# Patient Record
Sex: MALE | Race: WHITE | NOT HISPANIC OR LATINO | Employment: FULL TIME | URBAN - METROPOLITAN AREA
[De-identification: names, ages, dates, MRNs, and addresses within clinical notes are randomized per-mention and may not be internally consistent; named-entity substitution may affect disease eponyms.]

---

## 2018-08-01 ENCOUNTER — OFFICE VISIT (OUTPATIENT)
Dept: GASTROENTEROLOGY | Facility: CLINIC | Age: 43
End: 2018-08-01

## 2018-08-01 VITALS
HEIGHT: 74 IN | BODY MASS INDEX: 24.43 KG/M2 | TEMPERATURE: 97.7 F | WEIGHT: 190.4 LBS | SYSTOLIC BLOOD PRESSURE: 138 MMHG | HEART RATE: 85 BPM | OXYGEN SATURATION: 100 % | DIASTOLIC BLOOD PRESSURE: 78 MMHG

## 2018-08-01 DIAGNOSIS — R10.33 PERIUMBILICAL ABDOMINAL PAIN: Primary | ICD-10-CM

## 2018-08-01 DIAGNOSIS — R19.4 CHANGE IN BOWEL HABITS: ICD-10-CM

## 2018-08-01 PROCEDURE — 99204 OFFICE O/P NEW MOD 45 MIN: CPT | Performed by: NURSE PRACTITIONER

## 2018-08-01 NOTE — PATIENT INSTRUCTIONS
"Start Over The Counter (OTC) probiotic (Culturelle, Harvey's Colon, Health, Align or any probiotics that contain \"Lactobacillus\" or \"Bifidobacterium\") once daily.      "

## 2018-08-01 NOTE — PROGRESS NOTES
"    GASTROENTEROLOGY OUTPATIENT NEW PATIENT NOTE  Patient: AIMEE RUANO : 1975  Date of Service: 2018  Dear WILFREDO Baker   Thank you for your referral of this patient for evaluation of abd pain  CC: Abdominal Pain  Assessment/Plan                                             ASSESSMENT & PLANS     Periumbilical abdominal pain  Change in bowel habits  -     Colonoscopy; Future  -     CT Abdomen Pelvis With Contrast; Future  · Start Over The Counter (OTC) probiotic (Culturelle, Harvey's Colon, Health, Align or any probiotics that contain \"Lactobacillus\" or \"Bifidobacterium\") once daily.    [Addendum:  CT result discussed w/ Dr Brunner.  Dr Brunner says that he will review the images when he is back to work and that Dr Brunner will put in order for repeat labs since pt's most recent lab was at his PCP's office and it was in 10/2017.]    Follow Up:Pending test result.      DISCUSSION: The patient’s case was discussed with Dr. Brunner, and we collaborated pt’s plan of care.The above plan was delineated in details with patient and all questions and concerns were answered.  Patient is also given contact information.  Patient is to return as scheduled or sooner if new problems arise  Subjective                                                     SUBJECTIVE   History of Present Illness  Mr. Aimee Ruano is a 42 y.o. male, Dr Brunner's friend, who presents to the clinic today for an evaluation of Abdominal Pain    Onset Oct or 2017  Intermittent, occuring daily  Worsening the last few weeks     Location Right side of umbilicus     Triggers  Worsens by Worse w/ sitting  Coffee, alcohol  Not sure eating makes pain worse, but large meals     Severity/Quality/  Frequency Feeling like pressure. Dull. Annoying   Feeling like a soft ball     Relieving factors  Standing        Associating Sx  + and - Mostly loose stools or Huntington 5 or 6.  Pt attributes to eating lots of fruit and veggies.  Pt " is a vegetarian.  No red meat.     Pt denies dark black stools or bright red blood in the stools, in the toilet bowl, or on the toilet tissue.      ++bloating, chantal large meals     Prior Dx workup Colonoscopy many yrs ago     Pertinent Hx Traveled out of the country often.  No fever of chills     Risk factors  Pt  reports that he has never smoked. He does not have any smokeless tobacco history on file.  Pt  reports that he drinks alcohol.  Body mass index is 24.45 kg/m². wt gain about 5 lbs    No aleve or any other NSAIDS.        Prior Tx Tried probiotic 4 days ago.        ROS:Review of Systems   Constitutional: Negative for activity change, appetite change, fatigue, fever and unexpected weight change.   HENT: Negative for hearing loss, trouble swallowing and voice change.    Eyes: Negative for visual disturbance.   Respiratory: Negative for cough, choking, chest tightness and shortness of breath.    Cardiovascular: Negative for chest pain.   Gastrointestinal: Positive for abdominal distention (bloating), abdominal pain (RLQ) and diarrhea. Negative for anal bleeding, blood in stool, constipation, nausea, rectal pain and vomiting.   Endocrine: Negative for polydipsia and polyphagia.   Genitourinary: Negative.    Musculoskeletal: Negative for gait problem and joint swelling.   Skin: Negative for color change and rash.   Allergic/Immunologic: Negative for food allergies.   Neurological: Negative for dizziness, seizures and speech difficulty.   Hematological: Negative for adenopathy.   Psychiatric/Behavioral: Negative for confusion.     Subjective   PAST MED HX: Pt  has no past medical history on file.  PAST SURG HX: Pt  has no past surgical history on file.  FAM HX: family history includes Colon cancer in his mother.  SOC HX: Pt  reports that he has never smoked. He does not have any smokeless tobacco history on file. He reports that he drinks alcohol.   Works at [employer].  Objective                                                            OBJECTIVE   Allergy: Pt has No Known Allergies.  MEDS: No current outpatient prescriptions on file.  Wt Readings from Last 5 Encounters:   08/01/18 86.4 kg (190 lb 6.4 oz)   body mass index is 24.45 kg/m².,Temp: 97.7 °F (36.5 °C),BP: 138/78,Heart Rate: 85     Per outside medical records from [facility]  Blood work on 10/11/17:  WBC 4.7 H&H 15.3/44.6 absolute eosinophil 0.0 MCV 89  serum creatinine 0.86 AST 16 ALT 14 alkaline phosphatase 71 total bili 1.0    [Addendum:   EXAMINATION: CT ABDOMEN AND PELVIS WITH CONTRAST-08/03/2018:      FINDINGS: The lung bases are grossly clear.  The liver demonstrates  mildly diffuse low attenuation throughout the hepatic parenchyma that  may represent mild hepatic steatosis without focal liver lesion. The  gallbladder is unremarkable. Nephrogram. No obstructive uropathy or  urolithiasis is evident. Portal veins and IVC patent. Nonaneurysmal  patent abdominal aorta. GI tract evaluation without focal thickening or  disproportionate dilatation of bowel. Appendix is visualized and  unremarkable. No free fluid or intra-abdominal free air. Sigmoid  diverticulosis without evidence for acute diverticulitis. Noted mildly  enlarged right lower quadrant mesenteric lymph nodes more conspicuous in  overall number than size as these are subcentimeter in long axis  dimension. The pelvic viscera are grossly unremarkable with moderately  distended urinary bladder, however, no bulky pelvic adenopathy or  significant free fluid. Degenerative changes of the spine are minimal  without aggressive osseous lesion. No soft tissue body wall findings of  concern specifically, no obvious inguinal hernia or superficial inguinal  adenopathy.     IMPRESSION:  1. Mildly enlarged right lower quadrant lymph nodes more conspicuous in  number than overall size, all of which are subcentimeter in long axis  dimension. Considerations for reactive adenopathy from enteritis  or  colitis not currently visible as there is no bowel wall thickening or  potential mesenteric adenitis.  2. Appendix is visualized and unremarkable.  3. Mild hepatic steatosis.    Physical Exam   Abdominal:         General Well developed; well nourished; no acute distress.   ENT Oral mucosa pink and moist without thrush or lesions.    Neck Neck supple; trachea midline. No thyromegaly   Resp CTA; no rhonchi, rales, or wheezes.  Respiration effort normal  CV RRR; normal S1, S2; no M/R/G. No lower extremity edema  GI Abd soft, mild TTP to area noted above, ND, normal active bowel sounds.  No HSM.  No abd hernia  Skin No rash; no lesions; no bruises.  Skin turgor normal  Musc No clubbing; no cyanosis.  Gait steady  Psych Oriented to time, place, and person.  Appropriate affect  Thank you kindly for allowing me to be part of this patient’s care.    Pt care team: Barbi VILLALOBOS & Blane Malagon [facility]  08/01/182:08 PM  [facility]--Gastroenterology  [phone]    CC: Dr.Cheryl EVELIA Tomas, WILFREDO  [address] / [place] [address] FAX:[phone]

## 2018-08-03 ENCOUNTER — HOSPITAL ENCOUNTER (OUTPATIENT)
Dept: CT IMAGING | Facility: HOSPITAL | Age: 43
Discharge: HOME | End: 2018-08-03
Admitting: NURSE PRACTITIONER

## 2018-08-03 DIAGNOSIS — R10.33 PERIUMBILICAL ABDOMINAL PAIN: ICD-10-CM

## 2018-08-03 PROCEDURE — 25010000002 IOPAMIDOL 61 % SOLUTION: Performed by: NURSE PRACTITIONER

## 2018-08-03 PROCEDURE — 74177 CT ABD & PELVIS W/CONTRAST: CPT

## 2018-08-03 RX ADMIN — IOPAMIDOL 90 ML: 612 INJECTION, SOLUTION INTRAVENOUS at 13:33

## 2018-08-03 RX ADMIN — BARIUM SULFATE 450 ML: 21 SUSPENSION ORAL at 13:33

## 2018-08-07 ENCOUNTER — TELEPHONE (OUTPATIENT)
Dept: GASTROENTEROLOGY | Facility: CLINIC | Age: 43
End: 2018-08-07

## 2018-08-10 NOTE — TELEPHONE ENCOUNTER
There is delay in response because I want to discuss CT result w/ Dr Brunner before calling pt back.  We played phone tags.  Dr Brunner and I were able to communicate.      CT result discussed w/ Dr Brunner.  Dr Brunner says that he will review the images when he is back to work and that Dr Brunner will put in order for repeat labs since pt's most recent lab was at his PCP's office and it was in 10/2017.    I attempt to call pt today. No answer. Left VM

## 2018-08-10 NOTE — TELEPHONE ENCOUNTER
CT scan result and plan discussed w/ pt  Pt has some updated labs since done in late July.  He will fax those result to me

## 2018-08-14 DIAGNOSIS — Z12.11 SCREENING FOR COLON CANCER: Primary | ICD-10-CM

## 2018-08-14 RX ORDER — PEG-3350, SODIUM SULFATE, SODIUM CHLORIDE, POTASSIUM CHLORIDE, SODIUM ASCORBATE AND ASCORBIC ACID 7.5-2.691G
KIT ORAL
Qty: 1 EACH | Refills: 0 | Status: SHIPPED | OUTPATIENT
Start: 2018-08-14 | End: 2018-09-20

## 2018-08-21 ENCOUNTER — LAB REQUISITION (OUTPATIENT)
Dept: LAB | Facility: HOSPITAL | Age: 43
End: 2018-08-21

## 2018-08-21 ENCOUNTER — OUTSIDE FACILITY SERVICE (OUTPATIENT)
Dept: GASTROENTEROLOGY | Facility: CLINIC | Age: 43
End: 2018-08-21

## 2018-08-21 DIAGNOSIS — Z80.0 FAMILY HISTORY OF MALIGNANT NEOPLASM OF DIGESTIVE ORGAN: ICD-10-CM

## 2018-08-21 DIAGNOSIS — Z12.11 ENCOUNTER FOR SCREENING FOR MALIGNANT NEOPLASM OF COLON: ICD-10-CM

## 2018-08-21 DIAGNOSIS — K63.3 ULCER OF INTESTINE: ICD-10-CM

## 2018-08-21 DIAGNOSIS — K57.30 DIVERTICULOSIS OF LARGE INTESTINE WITHOUT PERFORATION OR ABSCESS WITHOUT BLEEDING: ICD-10-CM

## 2018-08-21 PROCEDURE — 88305 TISSUE EXAM BY PATHOLOGIST: CPT | Performed by: INTERNAL MEDICINE

## 2018-08-21 PROCEDURE — 45380 COLONOSCOPY AND BIOPSY: CPT | Performed by: INTERNAL MEDICINE

## 2018-08-22 LAB
CYTO UR: NORMAL
LAB AP CASE REPORT: NORMAL
LAB AP CLINICAL INFORMATION: NORMAL
LAB AP DIAGNOSIS COMMENT: NORMAL
PATH REPORT.FINAL DX SPEC: NORMAL
PATH REPORT.GROSS SPEC: NORMAL

## 2018-09-07 ENCOUNTER — TELEPHONE (OUTPATIENT)
Dept: GASTROENTEROLOGY | Facility: CLINIC | Age: 43
End: 2018-09-07

## 2018-09-07 NOTE — TELEPHONE ENCOUNTER
I called patient regarding results of colonoscopy.  He sees Barbi in the office and she referred him for the procedure.

## 2018-09-07 NOTE — TELEPHONE ENCOUNTER
Discussed biopsy results with patient that show superficial acute inflammation concerning for NSAID induced injury.  Patient however voices he does not take any NSAIDs.  He states he knows Dr. Brunner personally and will discuss with him further.  Patient also sees Barbi in the office.

## 2018-09-20 DIAGNOSIS — R19.7 DIARRHEA, UNSPECIFIED TYPE: Primary | ICD-10-CM

## 2018-09-20 DIAGNOSIS — K63.3 ULCERATION OF INTESTINE: ICD-10-CM

## 2018-09-20 DIAGNOSIS — K76.0 FATTY LIVER: ICD-10-CM

## 2018-10-24 ENCOUNTER — LAB (OUTPATIENT)
Dept: LAB | Facility: HOSPITAL | Age: 43
End: 2018-10-24

## 2018-10-24 DIAGNOSIS — K63.3 ULCERATION OF INTESTINE: ICD-10-CM

## 2018-10-24 DIAGNOSIS — K76.0 FATTY LIVER: ICD-10-CM

## 2018-10-24 DIAGNOSIS — R19.7 DIARRHEA, UNSPECIFIED TYPE: ICD-10-CM

## 2018-10-24 LAB — VIT B12 BLD-MCNC: 1377 PG/ML (ref 211–911)

## 2018-10-24 PROCEDURE — 86140 C-REACTIVE PROTEIN: CPT

## 2018-10-24 PROCEDURE — 81479 UNLISTED MOLECULAR PATHOLOGY: CPT

## 2018-10-24 PROCEDURE — 86255 FLUORESCENT ANTIBODY SCREEN: CPT

## 2018-10-24 PROCEDURE — 83516 IMMUNOASSAY NONANTIBODY: CPT

## 2018-10-24 PROCEDURE — 36415 COLL VENOUS BLD VENIPUNCTURE: CPT

## 2018-10-24 PROCEDURE — 83520 IMMUNOASSAY QUANT NOS NONAB: CPT

## 2018-10-24 PROCEDURE — 81383 HLA II TYPING 1 ALLELE HR: CPT

## 2018-10-24 PROCEDURE — 82397 CHEMILUMINESCENT ASSAY: CPT

## 2018-10-24 PROCEDURE — 88350 IMFLUOR EA ADDL 1ANTB STN PX: CPT

## 2018-10-24 PROCEDURE — 82784 ASSAY IGA/IGD/IGG/IGM EACH: CPT

## 2018-10-24 PROCEDURE — 82607 VITAMIN B-12: CPT

## 2018-10-24 PROCEDURE — 81377 HLA II TYPE 1 AG EQUIV LR: CPT

## 2018-10-24 PROCEDURE — 88346 IMFLUOR 1ST 1ANTB STAIN PX: CPT

## 2018-10-24 PROCEDURE — 82657 ENZYME CELL ACTIVITY: CPT

## 2018-10-25 LAB
ENDOMYSIUM IGA SER QL: NEGATIVE
GLIADIN PEPTIDE IGA SER-ACNC: 4 UNITS (ref 0–19)
GLIADIN PEPTIDE IGG SER-ACNC: 2 UNITS (ref 0–19)
IGA SERPL-MCNC: 378 MG/DL (ref 90–386)
TTG IGA SER-ACNC: <2 U/ML (ref 0–3)
TTG IGG SER-ACNC: 3 U/ML (ref 0–5)

## 2018-10-29 LAB
CONV COMMENT: NORMAL
DEPRECATED HLA-DQ2 QL: POSITIVE
HLA-DQ8 QL: NEGATIVE
Lab: NORMAL

## 2018-10-30 LAB — SPECIMEN STATUS: NORMAL

## 2018-11-05 LAB — REF LAB TEST RESULTS: NORMAL

## 2021-03-12 ENCOUNTER — IMMUNIZATION (OUTPATIENT)
Dept: VACCINE CLINIC | Facility: HOSPITAL | Age: 46
End: 2021-03-12

## 2021-03-12 PROCEDURE — 91300 HC SARSCOV02 VAC 30MCG/0.3ML IM: CPT | Performed by: INTERNAL MEDICINE

## 2021-03-12 PROCEDURE — 0001A: CPT | Performed by: INTERNAL MEDICINE

## 2021-04-02 ENCOUNTER — APPOINTMENT (OUTPATIENT)
Dept: VACCINE CLINIC | Facility: HOSPITAL | Age: 46
End: 2021-04-02

## 2021-04-12 ENCOUNTER — IMMUNIZATION (OUTPATIENT)
Dept: VACCINE CLINIC | Facility: HOSPITAL | Age: 46
End: 2021-04-12

## 2021-04-12 PROCEDURE — 0002A: CPT | Performed by: INTERNAL MEDICINE

## 2021-04-12 PROCEDURE — 91300 HC SARSCOV02 VAC 30MCG/0.3ML IM: CPT | Performed by: INTERNAL MEDICINE

## 2022-08-02 ENCOUNTER — TRANSCRIBE ORDERS (OUTPATIENT)
Dept: ADMINISTRATIVE | Facility: HOSPITAL | Age: 47
End: 2022-08-02

## 2022-08-02 DIAGNOSIS — Z11.59 SPECIAL SCREENING EXAMINATION FOR VIRAL DISEASE: Primary | ICD-10-CM

## 2022-09-12 ENCOUNTER — APPOINTMENT (OUTPATIENT)
Dept: PREADMISSION TESTING | Facility: HOSPITAL | Age: 47
End: 2022-09-12

## 2022-12-02 ENCOUNTER — DOCUMENTATION (OUTPATIENT)
Dept: GASTROENTEROLOGY | Facility: OTHER | Age: 47
End: 2022-12-02

## 2022-12-02 DIAGNOSIS — K63.3 ILEUM ULCER: ICD-10-CM

## 2022-12-02 DIAGNOSIS — I88.0 NONSPECIFIC MESENTERIC ADENITIS: ICD-10-CM

## 2022-12-02 DIAGNOSIS — R10.31 ABDOMINAL PAIN, RLQ: ICD-10-CM

## 2022-12-02 DIAGNOSIS — K52.9: Primary | ICD-10-CM

## 2022-12-02 NOTE — PROGRESS NOTES
Patient with recurrent diarrhea and RLQ pain. Previous colonoscopy showed aphthous ulcerations in the terminal ileum; biopsies revealed nonspecific ileitis. CT showed sub-centimeter lymph nodes in the RLQ, with consideration for reactive adenopathy from enteritis. Patient is concerned for Crohn's disease or a small bowel tumor not seen on imaging.    >> Will proceed with small bowel video capsule endoscopy.

## 2022-12-14 ENCOUNTER — APPOINTMENT (OUTPATIENT)
Dept: GASTROENTEROLOGY | Facility: HOSPITAL | Age: 47
End: 2022-12-14

## 2022-12-14 ENCOUNTER — OFFICE VISIT (OUTPATIENT)
Dept: GASTROENTEROLOGY | Facility: CLINIC | Age: 47
End: 2022-12-14

## 2022-12-14 ENCOUNTER — LAB (OUTPATIENT)
Dept: LAB | Facility: HOSPITAL | Age: 47
End: 2022-12-14

## 2022-12-14 VITALS
TEMPERATURE: 97.5 F | HEART RATE: 83 BPM | HEIGHT: 74 IN | WEIGHT: 204.6 LBS | BODY MASS INDEX: 26.26 KG/M2 | DIASTOLIC BLOOD PRESSURE: 76 MMHG | OXYGEN SATURATION: 99 % | SYSTOLIC BLOOD PRESSURE: 124 MMHG

## 2022-12-14 DIAGNOSIS — R10.31 RLQ ABDOMINAL PAIN: ICD-10-CM

## 2022-12-14 DIAGNOSIS — R10.31 RLQ ABDOMINAL PAIN: Primary | ICD-10-CM

## 2022-12-14 DIAGNOSIS — R11.0 NAUSEA: ICD-10-CM

## 2022-12-14 DIAGNOSIS — Z78.9 HX OF FOREIGN TRAVEL: ICD-10-CM

## 2022-12-14 LAB
BASOPHILS # BLD AUTO: 0.05 10*3/MM3 (ref 0–0.2)
BASOPHILS NFR BLD AUTO: 0.7 % (ref 0–1.5)
DEPRECATED RDW RBC AUTO: 40.5 FL (ref 37–54)
EOSINOPHIL # BLD AUTO: 0.04 10*3/MM3 (ref 0–0.4)
EOSINOPHIL NFR BLD AUTO: 0.6 % (ref 0.3–6.2)
ERYTHROCYTE [DISTWIDTH] IN BLOOD BY AUTOMATED COUNT: 12.4 % (ref 12.3–15.4)
HCT VFR BLD AUTO: 45.9 % (ref 37.5–51)
HGB BLD-MCNC: 15.7 G/DL (ref 13–17.7)
IMM GRANULOCYTES # BLD AUTO: 0.02 10*3/MM3 (ref 0–0.05)
IMM GRANULOCYTES NFR BLD AUTO: 0.3 % (ref 0–0.5)
LYMPHOCYTES # BLD AUTO: 1.77 10*3/MM3 (ref 0.7–3.1)
LYMPHOCYTES NFR BLD AUTO: 25.4 % (ref 19.6–45.3)
MCH RBC QN AUTO: 30.4 PG (ref 26.6–33)
MCHC RBC AUTO-ENTMCNC: 34.2 G/DL (ref 31.5–35.7)
MCV RBC AUTO: 89 FL (ref 79–97)
MONOCYTES # BLD AUTO: 0.54 10*3/MM3 (ref 0.1–0.9)
MONOCYTES NFR BLD AUTO: 7.7 % (ref 5–12)
NEUTROPHILS NFR BLD AUTO: 4.56 10*3/MM3 (ref 1.7–7)
NEUTROPHILS NFR BLD AUTO: 65.3 % (ref 42.7–76)
NRBC BLD AUTO-RTO: 0 /100 WBC (ref 0–0.2)
PLATELET # BLD AUTO: 240 10*3/MM3 (ref 140–450)
PMV BLD AUTO: 12.1 FL (ref 6–12)
RBC # BLD AUTO: 5.16 10*6/MM3 (ref 4.14–5.8)
WBC NRBC COR # BLD: 6.98 10*3/MM3 (ref 3.4–10.8)

## 2022-12-14 PROCEDURE — 86480 TB TEST CELL IMMUN MEASURE: CPT

## 2022-12-14 PROCEDURE — 99204 OFFICE O/P NEW MOD 45 MIN: CPT | Performed by: PHYSICIAN ASSISTANT

## 2022-12-14 PROCEDURE — 80053 COMPREHEN METABOLIC PANEL: CPT | Performed by: PHYSICIAN ASSISTANT

## 2022-12-14 PROCEDURE — 86140 C-REACTIVE PROTEIN: CPT

## 2022-12-14 PROCEDURE — 82607 VITAMIN B-12: CPT | Performed by: PHYSICIAN ASSISTANT

## 2022-12-14 PROCEDURE — 36415 COLL VENOUS BLD VENIPUNCTURE: CPT | Performed by: PHYSICIAN ASSISTANT

## 2022-12-14 PROCEDURE — 82306 VITAMIN D 25 HYDROXY: CPT | Performed by: PHYSICIAN ASSISTANT

## 2022-12-14 PROCEDURE — 85025 COMPLETE CBC W/AUTO DIFF WBC: CPT

## 2022-12-14 RX ORDER — LISINOPRIL 20 MG/1
TABLET ORAL
COMMUNITY
Start: 2022-11-01

## 2022-12-14 NOTE — PROGRESS NOTES
"     New Patient Consultation     Patient Name: Jasvir Keller  : 1975   MRN: [id number]     Chief Complaint:    Chief Complaint   Patient presents with   • Abdominal Pain       History of Present Illness: Jasvir Keller is a 47 y.o. male, PMH includes HTN, who is here today for a Gastroenterology Consultation for RLQ abdominal pain.    Pt was previously seen by Dr. Brunner for these issues. Historically, CSY revealed aphthous ulcerations in terminal ileum, bx reveal nonspecific ileitis. CT showed subcentimeter lymph notes in the RLQ, with consideration for reactive adenopathy from enteritis. Records from 2018 reveal IBD Sgi panel reveals pattern not consistent with IBD, lysosomal acid lypase normal, celiac panel unrevealing.     Pt continues to have right sided / RLQ abdominal pain that feels \"like a baseball\" deep in the abdomen. He states that the discomfort is pretty much constant, but intermittently worse. He has identified EtOH to be a trigger and has avoided such for a few months. He follows a vegetarian diet, has dabbled in gluten free diet without much change in sx. He does admit to frequent international travel, and wife that tested positive for Blastocystis spp. on stool studies recently. He notes associated nausea and cutaneous itchiness on anterior abdomen with these episodes.     Patient denies associated fever, chills, indigestion, vomiting, diarrhea, constipation, hematemesis, dysphagia, hematochezia, melena, weight loss or gain, dysuria, jaundice or bruising.    Patient denies personal or FHx of PUD, H Pylori, gastritis, pancreatitis, colitis, Celiac disease, UC, Crohn's disease, IBS or gastric cancers. Mother with colon cancer. Pt denies EtOH, tobacco, illicit substance or NSAID use.    No previous EGD.  CSY 2018 with Dr. Brunner.     Subjective      Review of Systems:   Review of Systems   Constitutional: Negative for appetite change, chills, diaphoresis, fatigue, fever, " unexpected weight gain and unexpected weight loss.   HENT: Negative for drooling, facial swelling, mouth sores, nosebleeds, rhinorrhea, sore throat, swollen glands, tinnitus and trouble swallowing.    Eyes: Negative.    Respiratory: Negative for choking, chest tightness and shortness of breath.    Gastrointestinal: Positive for abdominal pain (RLQ) and nausea. Negative for anal bleeding, blood in stool, constipation, diarrhea, vomiting, GERD and indigestion.   Endocrine: Negative.    Genitourinary: Negative for dysuria, flank pain and hematuria.   Musculoskeletal: Negative for arthralgias, back pain, myalgias and neck pain.   Skin: Positive for rash (itchiness on anterior abdomen). Negative for color change, dry skin, pallor and bruise.   Neurological: Negative for dizziness, tremors, syncope, weakness and numbness.   Psychiatric/Behavioral: Negative for decreased concentration, hallucinations and self-injury. The patient is not nervous/anxious.    All other systems reviewed and are negative.      Past Medical History: History reviewed. No pertinent past medical history.    Past Surgical History:   Past Surgical History:   Procedure Laterality Date   • COLONOSCOPY         Family History:   Family History   Problem Relation Age of Onset   • Colon cancer Mother        Social History:   Social History     Socioeconomic History   • Marital status:    Tobacco Use   • Smoking status: Never   Vaping Use   • Vaping Use: Never used   Substance and Sexual Activity   • Alcohol use: Yes       Alcohol/Tobacco History:   Social History     Substance and Sexual Activity   Alcohol Use Yes     Social History     Tobacco Use   Smoking Status Never   Smokeless Tobacco Not on file       Medications:     Current Outpatient Medications:   •  lisinopril (PRINIVIL,ZESTRIL) 20 MG tablet, , Disp: , Rfl:     Allergies:   No Known Allergies    Objective     Physical Exam:  Vital Signs:   Vitals:    12/14/22 1341   BP: 124/76   BP  "Location: Left arm   Patient Position: Sitting   Cuff Size: Infant   Pulse: 83   Temp: 97.5 °F (36.4 °C)   TempSrc: Temporal   SpO2: 99%   Weight: 92.8 kg (204 lb 9.6 oz)   Height: 188 cm (74\")     Body mass index is 26.27 kg/m².     Physical Exam  Vitals and nursing note reviewed.   Constitutional:       General: He is not in acute distress.     Appearance: Normal appearance. He is not ill-appearing or diaphoretic.      Comments: Pleasantly conversant   HENT:      Head: Normocephalic and atraumatic.      Right Ear: External ear normal.      Left Ear: External ear normal.      Nose: Nose normal. No rhinorrhea.      Mouth/Throat:      Mouth: Mucous membranes are moist.      Pharynx: Oropharynx is clear. No posterior oropharyngeal erythema.   Eyes:      General:         Right eye: No discharge.         Left eye: No discharge.      Conjunctiva/sclera: Conjunctivae normal.      Pupils: Pupils are equal, round, and reactive to light.   Cardiovascular:      Rate and Rhythm: Normal rate and regular rhythm.      Pulses: Normal pulses.      Heart sounds: No murmur heard.    No friction rub.   Pulmonary:      Effort: Pulmonary effort is normal. No respiratory distress.      Breath sounds: Normal breath sounds. No wheezing.   Abdominal:      General: Abdomen is flat. There is no distension.      Palpations: There is no mass.      Tenderness: There is abdominal tenderness (RLQ, right lateral abdomen). There is no guarding or rebound.   Musculoskeletal:         General: Normal range of motion.      Cervical back: Normal range of motion and neck supple. No rigidity.   Skin:     General: Skin is warm and dry.      Capillary Refill: Capillary refill takes less than 2 seconds.      Coloration: Skin is not jaundiced or pale.   Neurological:      General: No focal deficit present.      Mental Status: He is alert and oriented to person, place, and time. Mental status is at baseline.   Psychiatric:         Mood and Affect: Mood normal.  "        Thought Content: Thought content normal.         Judgment: Judgment normal.         Assessment / Plan      Assessment/Plan:   There are no diagnoses linked to this encounter.     RLQ abdominal pain  Nausea  Hx international travel   - previous labs, imaging, endoscopy and pathology reports reviewed   - obtain CBC, CMP, CRP, vit D, vit B12, quantiferon TB, stool panel, C difficile, ova + parasites,    - obtain CT enterography   - schedule for [facility], pending above results   - follow up in clinic after completion of above studies   - call clinic at any time for questions or new / worsened sx    Follow Up:   Return in about 6 weeks (around 1/25/2023) for Next scheduled follow up.    Plan of care reviewed with the patient at the conclusion of today's visit.  Education was provided regarding diagnosis, management, and any prescribed or recommended OTC medications.  Patient verbalized understanding of and agreement with management plan.     NOTE TO PATIENT: The 21st Century Cures Act makes medical notes like these available to patients in the interest of transparency. However, be advised this is a medical document. It is intended as peer to peer communication. It is written in medical language and may contain abbreviations or verbiage that are unfamiliar. It may appear blunt or direct. Medical documents are intended to carry relevant information, facts as evident, and the clinical opinion of the practitioner.     Time Statement:   Discussed plan of care in detail with patient today. Patient verbally understands and agrees. I have spent 45 minutes reviewing available diagnostics, obtaining history, examining the patient, developing a treatment plan, and educating the patient on disease process and plan of care.    Gini Sarah PA-C   [facility]

## 2022-12-15 ENCOUNTER — HOSPITAL ENCOUNTER (OUTPATIENT)
Dept: CT IMAGING | Facility: HOSPITAL | Age: 47
Discharge: HOME | End: 2022-12-15
Admitting: PHYSICIAN ASSISTANT

## 2022-12-15 LAB
25(OH)D3 SERPL-MCNC: 29.8 NG/ML (ref 30–100)
ALBUMIN SERPL-MCNC: 4.6 G/DL (ref 3.5–5.2)
ALBUMIN/GLOB SERPL: 1.7 G/DL
ALP SERPL-CCNC: 77 U/L (ref 39–117)
ALT SERPL W P-5'-P-CCNC: 16 U/L (ref 1–41)
ANION GAP SERPL CALCULATED.3IONS-SCNC: 9.7 MMOL/L (ref 5–15)
AST SERPL-CCNC: 18 U/L (ref 1–40)
BILIRUB SERPL-MCNC: 0.7 MG/DL (ref 0–1.2)
BUN SERPL-MCNC: 8 MG/DL (ref 6–20)
BUN/CREAT SERPL: 10.1 (ref 7–25)
CALCIUM SPEC-SCNC: 9.6 MG/DL (ref 8.6–10.5)
CHLORIDE SERPL-SCNC: 103 MMOL/L (ref 98–107)
CO2 SERPL-SCNC: 26.3 MMOL/L (ref 22–29)
CREAT SERPL-MCNC: 0.79 MG/DL (ref 0.76–1.27)
CRP SERPL-MCNC: <0.3 MG/DL (ref 0–0.5)
EGFRCR SERPLBLD CKD-EPI 2021: 109.6 ML/MIN/1.73
GLOBULIN UR ELPH-MCNC: 2.7 GM/DL
GLUCOSE SERPL-MCNC: 88 MG/DL (ref 65–99)
POTASSIUM SERPL-SCNC: 3.7 MMOL/L (ref 3.5–5.2)
PROT SERPL-MCNC: 7.3 G/DL (ref 6–8.5)
SODIUM SERPL-SCNC: 139 MMOL/L (ref 136–145)
VIT B12 BLD-MCNC: 1695 PG/ML (ref 211–946)

## 2022-12-15 PROCEDURE — 0 IOPAMIDOL PER 1 ML: Performed by: PHYSICIAN ASSISTANT

## 2022-12-15 PROCEDURE — 74178 CT ABD&PLV WO CNTR FLWD CNTR: CPT

## 2022-12-15 RX ADMIN — IOPAMIDOL 150 ML: 755 INJECTION, SOLUTION INTRAVENOUS at 17:10

## 2022-12-15 NOTE — PROGRESS NOTES
Please let Dima know that his labs reveal normal CBC, CMP, CRP. Vit B12 elevated, which is consistent with use of oral / IM vit B12 supplementation. Vit D is marginally low, and I would recommend ergocalciferol weekly x 12 weeks if pt is agreeable to such.    Thanks!  Gini Sarah PA-C

## 2022-12-17 LAB
GAMMA INTERFERON BACKGROUND BLD IA-ACNC: 0.01 IU/ML
M TB IFN-G BLD-IMP: NEGATIVE
M TB IFN-G CD4+ BCKGRND COR BLD-ACNC: 0.04 IU/ML
M TB IFN-G CD4+CD8+ BCKGRND COR BLD-ACNC: 0.04 IU/ML
MITOGEN IGNF BLD-ACNC: >10 IU/ML
SERVICE CMNT-IMP: NORMAL

## 2022-12-20 ENCOUNTER — TELEPHONE (OUTPATIENT)
Dept: GASTROENTEROLOGY | Facility: CLINIC | Age: 47
End: 2022-12-20

## 2022-12-28 NOTE — TELEPHONE ENCOUNTER
Returned patient call. No answer, left voice message. Sent [facility] message with results / next steps. Thanks!

## 2023-01-11 ENCOUNTER — LAB (OUTPATIENT)
Dept: LAB | Facility: HOSPITAL | Age: 48
End: 2023-01-11
Payer: COMMERCIAL

## 2023-01-11 DIAGNOSIS — R10.31 RLQ ABDOMINAL PAIN: ICD-10-CM

## 2023-01-11 LAB
ADV 40+41 DNA STL QL NAA+NON-PROBE: NOT DETECTED
ASTRO TYP 1-8 RNA STL QL NAA+NON-PROBE: NOT DETECTED
C CAYETANENSIS DNA STL QL NAA+NON-PROBE: NOT DETECTED
C COLI+JEJ+UPSA DNA STL QL NAA+NON-PROBE: NOT DETECTED
C DIFF TOX GENS STL QL NAA+PROBE: NOT DETECTED
CRYPTOSP DNA STL QL NAA+NON-PROBE: NOT DETECTED
E HISTOLYT DNA STL QL NAA+NON-PROBE: NOT DETECTED
EAEC PAA PLAS AGGR+AATA ST NAA+NON-PRB: NOT DETECTED
EC STX1+STX2 GENES STL QL NAA+NON-PROBE: NOT DETECTED
EPEC EAE GENE STL QL NAA+NON-PROBE: NOT DETECTED
ETEC LTA+ST1A+ST1B TOX ST NAA+NON-PROBE: NOT DETECTED
G LAMBLIA DNA STL QL NAA+NON-PROBE: NOT DETECTED
NOROVIRUS GI+II RNA STL QL NAA+NON-PROBE: NOT DETECTED
P SHIGELLOIDES DNA STL QL NAA+NON-PROBE: NOT DETECTED
RVA RNA STL QL NAA+NON-PROBE: NOT DETECTED
S ENT+BONG DNA STL QL NAA+NON-PROBE: NOT DETECTED
SAPO I+II+IV+V RNA STL QL NAA+NON-PROBE: NOT DETECTED
SHIGELLA SP+EIEC IPAH ST NAA+NON-PROBE: NOT DETECTED
V CHOL+PARA+VUL DNA STL QL NAA+NON-PROBE: NOT DETECTED
V CHOLERAE DNA STL QL NAA+NON-PROBE: NOT DETECTED
Y ENTEROCOL DNA STL QL NAA+NON-PROBE: NOT DETECTED

## 2023-01-11 PROCEDURE — 87507 IADNA-DNA/RNA PROBE TQ 12-25: CPT

## 2023-01-11 PROCEDURE — 87493 C DIFF AMPLIFIED PROBE: CPT

## 2023-01-11 PROCEDURE — 87177 OVA AND PARASITES SMEARS: CPT | Performed by: PHYSICIAN ASSISTANT

## 2023-01-11 PROCEDURE — 87209 SMEAR COMPLEX STAIN: CPT | Performed by: PHYSICIAN ASSISTANT

## 2023-01-13 LAB
O+P SPEC MICRO: NORMAL
O+P STL TRI STN: NORMAL